# Patient Record
Sex: MALE | URBAN - METROPOLITAN AREA
[De-identification: names, ages, dates, MRNs, and addresses within clinical notes are randomized per-mention and may not be internally consistent; named-entity substitution may affect disease eponyms.]

---

## 2023-08-02 ENCOUNTER — APPOINTMENT (RX ONLY)
Dept: URBAN - METROPOLITAN AREA CLINIC 168 | Facility: CLINIC | Age: 68
Setting detail: DERMATOLOGY
End: 2023-08-02

## 2023-08-02 DIAGNOSIS — L28.1 PRURIGO NODULARIS: ICD-10-CM

## 2023-08-02 PROCEDURE — ? COUNSELING

## 2023-08-02 PROCEDURE — 99204 OFFICE O/P NEW MOD 45 MIN: CPT

## 2023-08-02 PROCEDURE — ? ADDITIONAL NOTES

## 2023-08-02 NOTE — PROCEDURE: MIPS QUALITY
Detail Level: Detailed
Quality 431: Preventive Care And Screening: Unhealthy Alcohol Use - Screening: Patient not identified as an unhealthy alcohol user when screened for unhealthy alcohol use using a systematic screening method
Quality 110: Preventive Care And Screening: Influenza Immunization: Influenza Immunization previously received during influenza season
Quality 226: Preventive Care And Screening: Tobacco Use: Screening And Cessation Intervention: Patient screened for tobacco use and is an ex/non-smoker
Quality 111:Pneumonia Vaccination Status For Older Adults: Patient did not receive any pneumococcal conjugate or polysaccharide vaccine on or after their 60th birthday and before the end of the measurement period
Quality 130: Documentation Of Current Medications In The Medical Record: Current Medications Documented

## 2023-08-02 NOTE — PROCEDURE: ADDITIONAL NOTES
Additional Notes: - Tried and failed: Dupixent, Adbry, Otezla, Methotrexate, ILK, topical steroids\\n- Had previously tried naturopathic approaches to gut health, which were only transiently, and modestly effective\\n- Diagnosed about 10 years and seen Dr. Farhan Venegas for disease since 2018 \\n- Adx of atopic dermatitis \\n\\n- Currently on Cibinqo 200 mg daily, which has improved skin by >90% since initiation (approx 3 months)\\n- Pt has had no issues with either intolerance or AEs\\n- Nearly clear of prurigo nodules, and without meaningful pruritus\\n\\n- Has paid varying amounts for monthly drug supply - but states that 'this isn't an issue' for him at this time.  We offered to look into copay assistance, but he declined.  \\n\\n- Patient appears to be well managed and able to access his Cibinqo reliably.  \\n- Encouraged to stay up-to-date with all general medical age-appropriate screenings/vaccinations, etc.\\n- No additional recommendations for care at this time.\\n\\n\\nPlan: \\n- Continue Cibinqo 200mg QD\\n- Continue management with Dr. Venegas
Patient Management Risk Assessment: Moderate
Render Risk Assessment In Note?: no
Detail Level: Simple

## 2024-10-31 ENCOUNTER — APPOINTMENT (RX ONLY)
Dept: URBAN - METROPOLITAN AREA CLINIC 168 | Facility: CLINIC | Age: 69
Setting detail: DERMATOLOGY
End: 2024-10-31

## 2024-10-31 DIAGNOSIS — L08.9 LOCAL INFECTION OF THE SKIN AND SUBCUTANEOUS TISSUE, UNSPECIFIED: ICD-10-CM

## 2024-10-31 DIAGNOSIS — L28.1 PRURIGO NODULARIS: ICD-10-CM | Status: STABLE

## 2024-10-31 PROCEDURE — ? ORDER TESTS

## 2024-10-31 PROCEDURE — ? ADDITIONAL NOTES

## 2024-10-31 PROCEDURE — ? COUNSELING

## 2024-10-31 PROCEDURE — 99214 OFFICE O/P EST MOD 30 MIN: CPT

## 2024-10-31 ASSESSMENT — LOCATION SIMPLE DESCRIPTION DERM
LOCATION SIMPLE: LEFT NOSE
LOCATION SIMPLE: RIGHT NOSE

## 2024-10-31 ASSESSMENT — LOCATION ZONE DERM: LOCATION ZONE: NOSE

## 2024-10-31 ASSESSMENT — LOCATION DETAILED DESCRIPTION DERM
LOCATION DETAILED: LEFT NARIS
LOCATION DETAILED: RIGHT NARIS

## 2024-10-31 NOTE — PROCEDURE: ORDER TESTS
Performing Laboratory: [id number]
Bill For Surgical Tray: no
Expected Date Of Service: 10/31/2024
Billing Type: Third-Party Bill
Lab Facility: [id number]

## 2024-10-31 NOTE — HPI: OTHER
Condition:: Prurigo Nodularis
Please Describe Your Condition:: is an established patient who is being seen for a chief complaint of Prurigo Nodularis . 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  He has continued to be managed by Dr. Farhan Venegas, since last visit. \\n- 90% improvement from baseline\\n\\n\\nPatient presents today to discuss new medication for PN that came to market that may be less immunosuppressive than his current treatment as he has been getting frequent infections (viral URIs) that are more challenging to clear.

## 2024-10-31 NOTE — PROCEDURE: ADDITIONAL NOTES
Additional Notes: - Tried and failed: Dupixent, Adbry, Otezla, Methotrexate, ILK, topical steroids\\n- Diagnosed about 10 years and seen Dr. Farhan Venegas for disease since 2018 \\n- Adx of atopic dermatitis \\n\\n- Currently on Cibinqo 200 mg daily, which has improved skin by >90% \\n- Pt reports frequent and lingering infections (skin infections w/ cellulitis x2, and URI) now that he is on cibinqo \\n- Has few persistent PN lesions on legs \\n- Discussed option to transition to Nemluvio, so as to reduce pt predisposition to infections. \\n- Pt has f/u with Dr Venegas in January and expresses desire to continue on Cibinqo until he may then transition at next f/u with Dr. Venegas in January.\\n\\n- Culture performed from posterior nasal cavity, to better assess if he may be a staph carrier, and hence the recurrent cellulitis.\\n\\nPlan: \\n- Continue Cibinqo 200mg QD\\n- Will call pt w/ culture results once avaiable\\n- Continue management with Dr. Venegas, per patient request.
Patient Management Risk Assessment: Moderate
Render Risk Assessment In Note?: no
Detail Level: Simple
Additional Notes: Pt reports two recent skin infections where no bacteria was able to be found on culture but cleared with antibiotics \\nCultured nose to r/o staph carrier status